# Patient Record
Sex: FEMALE | Race: WHITE | NOT HISPANIC OR LATINO | Employment: FULL TIME | URBAN - METROPOLITAN AREA
[De-identification: names, ages, dates, MRNs, and addresses within clinical notes are randomized per-mention and may not be internally consistent; named-entity substitution may affect disease eponyms.]

---

## 2021-04-27 NOTE — PROGRESS NOTES
"ANA Townsend  [id number]  1975      Reason for visit:  Adnexal mass, elevated CA-125, thickened endometrial stripe on ultrasound thought to be endometrial polyp    Consultation:  Patient is being seen at the request of Dr. Chikis Kuo    History of present illness:  The patient is a 46 y.o. year old female who presents today for treatment and evaluation of the above issues.   She has been followed by Dr. Kuo for a right ovarian cyst found after a TVUS ordered to investigate previous Pap smear 5/2019 that resulted with \"benign endometrial cells.\" Per chart review, this cyst was initially thought to be an endometrioma. Of note, her previous Pap smear in 2018 was also with \"benign endometrial cells.\"  Most recent TVUS demonstrated 5.1 x 3.4 x 4.5 cm right ovarian cyst with septations, which was read as concerning for neoplasm. She was referred for further management. She is otherwise completely asymptomatic. Denies any lower abdominal pain, pressure or bulk symptoms, back pain, early satiety, nausea/vomiting, bladder or bowel complaints, vaginal bleeding or abnormal discharge.  Patient reports that Ca125 level has started to increase as it was being checked along with serial ultrasounds.    For new patients, On license of [facility] intake form from 4/28/2021 was reviewed and confirmed.    OBGYN History:  She is a G0.  She does not use HRT. She does have a history of abnormal pap smears and required LEEP in 2009, though reports normal Pap smears since. Last Pap smear 5/2019 with \"benign endometrial cells.\"      Oncologic History:  Oncology/Hematology History    No history exists.         Past Medical History:   Diagnosis Date   • Abnormal Pap smear of cervix    • Adnexal mass    • Anxiety    • Depression       - Anxiety  - Tracheobronchitis    Past Surgical History:   Procedure Laterality Date   • CERVICAL BIOPSY  W/ LOOP ELECTRODE EXCISION     • CHOLECYSTECTOMY     • LASIK     • TONSILLECTOMY        - Cholecystectomy  - " MACHELLE (2009)  - Lasix (2016)    MEDICATIONS: The current medication list was reviewed with the patient and updated in the EMR this date per the Medical Assistant. Medication dosages and frequencies were confirmed to be accurate.      Allergies:  is allergic to penicillins.   - Penicillin (hives)    Social History:   Social History     Socioeconomic History   • Marital status: Single     Spouse name: Not on file   • Number of children: Not on file   • Years of education: Not on file   • Highest education level: Not on file   Tobacco Use   • Smoking status: Never Smoker   • Smokeless tobacco: Never Used   Substance and Sexual Activity   • Alcohol use: Yes   • Drug use: Never       Family History:    Family History   Problem Relation Age of Onset   • Diabetes Mother    • Heart attack Mother    • Kidney cancer Father    • Diabetes Maternal Grandmother    • Diabetes Maternal Grandfather    • Prostate cancer Paternal Uncle        Health Maintenance:    Health Maintenance   Topic Date Due   • COLONOSCOPY  Never done   • ANNUAL PHYSICAL  Never done   • COVID-19 Vaccine (1) Never done   • TDAP/TD VACCINES (2 - Td) 02/22/2021   • HEPATITIS C SCREENING  Never done   • PAP SMEAR  Never done   • INFLUENZA VACCINE  08/01/2021   • Pneumococcal Vaccine 0-64  Aged Out     - Last mammogram 7/2020    Review of Systems   Constitutional: Negative for appetite change, chills, fatigue, fever and unexpected weight change.   HENT: Negative for congestion, hearing loss, sneezing, sore throat and tinnitus.    Eyes: Negative for visual disturbance.   Respiratory: Negative for cough, shortness of breath and wheezing.    Cardiovascular: Negative for chest pain, palpitations and leg swelling.   Gastrointestinal: Negative for abdominal distention, abdominal pain, constipation, diarrhea, nausea and vomiting.   Genitourinary: Negative for dyspareunia, dysuria, frequency, hematuria, pelvic pain, urgency and vaginal bleeding.   Musculoskeletal:  "Negative for arthralgias, back pain and myalgias.   Skin: Negative for color change, pallor and rash.   Neurological: Negative for dizziness, light-headedness, numbness and headaches.   Hematological: Negative for adenopathy. Does not bruise/bleed easily.   Psychiatric/Behavioral: Negative for dysphoric mood. The patient is not nervous/anxious.        Physical Exam    Vitals:    04/28/21 1322   BP: 137/84   Pulse: 85   Resp: 17   Temp: 98.2 °F (36.8 °C)   TempSrc: Temporal   SpO2: 98%   Weight: 88.8 kg (195 lb 12.8 oz)   Height: 165.1 cm (65\")   PainSc: 0-No pain       Body mass index is 32.58 kg/m².    Wt Readings from Last 3 Encounters:   04/28/21 88.8 kg (195 lb 12.8 oz)     GENERAL: Alert, well-appearing female appearing her stated age who is in no apparent distress.   HEENT: Sclera anicteric. Head normocephalic, atraumatic. Mucus membranes moist.   NECK: Trachea midline, supple, without masses.  No thyromegaly.   BREASTS: Deferred  CARDIOVASCULAR: Normal rate, regular rhythm. No peripheral edema.  RESPIRATORY: Clear to auscultation bilaterally, normal respiratory effort  BACK:  No CVA tenderness, no vertebral tenderness on palpation  GASTROINTESTINAL:  Abdomen is soft, non-tender, non-distended, no rebound or guarding, no masses, or hernias.  SKIN:  Warm, dry, well-perfused.  All visible areas intact.  No rashes, lesions, ulcers.  PSYCHIATRIC: AO x3, with appropriate affect, normal thought processes.  NEUROLOGIC: No focal deficits. Moves extremities well.  MUSCULOSKELETAL: Normal gait and station.   EXTREMITIES:   No cyanosis, clubbing, symmetric.  LYMPHATICS:  No cervical or inguinal adenopathy noted.     PELVIC exam:    External genitalia are free from lesion. On speculum examination, the cervix was free from lesion. On bimanual examination no mass was appreciated.  Uterus was normal in size and shape. There is no cervical motion or uterine tenderness. No cervical mass was palpated. Parametria were smooth. " Rectovaginal exam was deferred.     ECOG PS 0    Diagnostic Data:      TVUS, 4/23/2021 (OS[facility])  Uterus: 7.2 x 4 x 5.8-cm; endometrial thickness 9-mm; retroverted, retroflexed, normal in size. No myometrial abnormality seen. In the region of the fundus, there is a 5-mm diameter hyperechoic endometrial polyp.   Right adnexa: ovary size 4.2 x 6.7 x 4.2cm. Right ovary is enlarged due to presence of a 5.1 x 3.4 x 4.5 cm cyst. It shows incomplete septations. No obvious solid soft tissue is seen in the wall of the cyst. No adnexal mass.   Left adnexa: ovary size 4.1 x 2.3 x 2 cm. Normal in size and appearance. No adnexal mass.   Cul-de-sac: No fluid or mass  Impression:  1. Right ovarian cyst which has a neoplastic appearance. MR pelvis with and without contrast is recommended for further evaluation.   2. Endometrial polyp    No results found for: WBC, HGB, HCT, MCV, PLT, NEUTROABS, GLUCOSE, BUN, CREATININE, EGFRIFNONA, EGFRIFAFRI, NA, K, CL, CO2, MG, PHOS, CALCIUM, ALBUMIN, AST, ALT, BILITOT  No results found for:        CA-125 4/8/2021 (OSH) 67    Assessment/Plan   This is a 46 y.o. woman with a right ovarian cystic mass with newly elevated Ca-125.  Encounter Diagnoses   Name Primary?   • Right ovarian cyst Yes   • Elevated CA-125    • Non-atypical endometrial cells of cervix on Pap smear    • Endometrial polyp    • Endometrial thickening on ultrasound      Right ovarian mass  Elevated CA-125  - Currently asymptomatic  - Adnexal mass has been followed by US since 2018 with previously normal Ca-125. Recommended surgical removal of right ovary due to new ultrasound changes and newly elevated Ca-125.   -Limitations of Ca1 25 testing were discussed.  -Patient understands that subsequent surgery/treatment may be recommended based on findings either at the time of surgery or on final pathology.    Benign endometrial cells on Pap smears 2018 + 2019  Thickened endometrium on ultrasound thought to be endometrial polyp  -  Otherwise asymptomatic  - Recommended hysteroscopy with D&C for endometrial sampling as patient will be asleep and this would give definitive information about the ultrasound findings.    Patient was consented for diagnostic laparoscopy, right salpingo-oophorectomy, left salpingectomy, dilation and curettage, hysteroscopy.      Risks and benefits of surgery were discussed.  This included, but was not limited to, infection and bleeding like when the skin is cut; damage to surrounding structures; and incisional complications.  Risk of DVT was addressed for major surgeries.  Standard of care efforts to minimize these risks were reviewed.  Typical hospital stay and recovery were discussed as well as post-procedure precautions.  Surgical implications of chronic illnesses on recovery and surgical outcome were reviewed.     Pain medication regimen for postoperative care was discussed.  Typical regimen and avoidance of narcotics was discussed.  Patient was educated that other factors, such as existing narcotic use, can impact postoperative pain management.      Patient verbalized understanding of the plan including the risks and benefits.  Appropriate perioperative testing including laboratory evaluation, EKG as clinically indicated, chest x-ray as clinically indicated, and preadmission evaluation were all ordered as a part of this patient's care.      Pain assessment was performed today as a part of patient’s care.  For patients with pain related to surgery, gynecologic malignancy or cancer treatment, the plan is as noted in the assessment/plan.  For patients with pain not related to these issues, they are to seek any further needed care from a more appropriate provider, such as PCP.      Orders Placed This Encounter   Procedures   • SCANNED - IMAGING   • SCANNED - LABS     FOLLOW UP: Surgery    Patient was seen and examined with Dr. Joaquin,  resident, who performed portions of the examination and documentation for  this patient's care under my direct supervision.  I agree with the above documentation and plan.    Yanna Valdez MD  04/29/21  13:24 EDT

## 2021-04-28 ENCOUNTER — OFFICE VISIT (OUTPATIENT)
Dept: GYNECOLOGIC ONCOLOGY | Facility: CLINIC | Age: 46
End: 2021-04-28

## 2021-04-28 ENCOUNTER — PATIENT EDUCATION (SURGERY INSTRUCTIONS) (OUTPATIENT)
Dept: GYNECOLOGIC ONCOLOGY | Facility: CLINIC | Age: 46
End: 2021-04-28

## 2021-04-28 VITALS
SYSTOLIC BLOOD PRESSURE: 137 MMHG | RESPIRATION RATE: 17 BRPM | HEIGHT: 65 IN | DIASTOLIC BLOOD PRESSURE: 84 MMHG | TEMPERATURE: 98.2 F | WEIGHT: 195.8 LBS | HEART RATE: 85 BPM | OXYGEN SATURATION: 98 % | BODY MASS INDEX: 32.62 KG/M2

## 2021-04-28 DIAGNOSIS — R97.1 ELEVATED CA-125: ICD-10-CM

## 2021-04-28 DIAGNOSIS — N84.0 ENDOMETRIAL POLYP: ICD-10-CM

## 2021-04-28 DIAGNOSIS — N83.201 RIGHT OVARIAN CYST: Primary | ICD-10-CM

## 2021-04-28 DIAGNOSIS — R87.618 NON-ATYPICAL ENDOMETRIAL CELLS OF CERVIX ON PAP SMEAR: ICD-10-CM

## 2021-04-28 DIAGNOSIS — R93.89 ENDOMETRIAL THICKENING ON ULTRASOUND: ICD-10-CM

## 2021-04-28 PROCEDURE — 99204 OFFICE O/P NEW MOD 45 MIN: CPT | Performed by: OBSTETRICS & GYNECOLOGY

## 2021-04-28 RX ORDER — BUPROPION HYDROCHLORIDE 300 MG/1
TABLET ORAL
COMMUNITY
Start: 2021-04-23

## 2021-04-28 RX ORDER — FLUTICASONE PROPIONATE 50 MCG
SPRAY, SUSPENSION (ML) NASAL
COMMUNITY
Start: 2021-04-22

## 2021-04-28 RX ORDER — MONTELUKAST SODIUM 10 MG/1
TABLET ORAL
COMMUNITY
Start: 2021-04-23

## 2021-05-04 NOTE — PATIENT INSTRUCTIONS
Outpatient Pre-op Patient Education  [facility]       ANA Townsend  [id number]  1975    SURGEON: Yanna Valdez MD    Surgery Coordinator : Josselyn   If you have any questions           If you have FMLA paperwork or Short Term Disability, please give that to the  either during your visit or after your surgery. A family member may bring the paper work if you can't or your employer may fax it to [phone].               Appointment  1. You have Covid Testing on 05/23/2021  at 8:45 am. This is located at [address] [place] [address] located in the lower level of the building ( basement ). Upon arrival please park in the designated parking spaces located to the left of the building. Once parked, please stay in your car and call [phone]. A member of the clinic will conduct your screening before leaving your vehicle.    1. Your surgery has been scheduled on 05/25/2021 at [facility] located at [address] [facility]. You will need to be there at 9:30 am       The Day(s) Before Surgery  1.  Do not drink alcohol or smoke.     2.  Do not take vitamins or aspirin one week before surgery.       3.  If you are ill on the days leading up to your surgery, please call our office.      4.  If you are using medications for diabetes, call the physician who manages these and get instructions on how they should be taken before and after surgery.    5.  Nothing to eat or drink after midnight on 05/24/2021      6.  Please make prior arrangements for someone to drive you home after your procedure        The Day of Surgery  1.  Do not eat, drink, or chew gum.     2.  On the morning of your surgery, you may take her prescription medications with a sip of water. Bring all medication with you to the surgery center. (Diabetic patients should bring insulin if instructed to do so by their diabetes managing physician).   3. Bathe or shower the morning of your  surgery. Do not use powders, lotions, or creams. Deodorants are okay.     4. Wear loose, comfortable clothing.     5. Bring holders for glasses, contacts, or dentures.      6. Bring any required payment and forms, including insurance cards. Leave all money and valuables at home.        Post-surgery Instructions   1.  For the first 24 hours, rest and take periodic deep breaths to remove anesthetic agents from your body.    2.  Follow any specific instructions relevant to your particular surgery.      3.  Limit activity to avoid stress to the surgical site.       4.  Keep all dressings dry. Shower or bathe as instructed by your doctor.      5.  Drink and eat light foods. Remain on liquids alone only if nausea and vomiting occur. Return to your regular diet gradually, as tolerance allows.     6. Avoid alcohol for at least 24 hours.       7.  Take prescription pain medication as directed and with food.       8.  Call our office after discharge from the surgery center to make your post-op appointment.         In Case of Emergency:       Call our office if you experience any of the following:    Excessive drainage, bleeding, swelling, or redness at the incision site   Severe pain not eased by pain medication  Temperature above 101    Persistent nausea or vomiting    Skin rash or general body itching

## 2021-05-19 ENCOUNTER — TELEPHONE (OUTPATIENT)
Dept: GYNECOLOGIC ONCOLOGY | Facility: CLINIC | Age: 46
End: 2021-05-19

## 2021-05-19 NOTE — TELEPHONE ENCOUNTER
I called patient to confirm her surgery and that she got her instructions. She did not . I have emailed them to her at [email or web address]. she verbalized all her dates and times.

## 2021-05-23 ENCOUNTER — APPOINTMENT (OUTPATIENT)
Dept: PREADMISSION TESTING | Facility: HOSPITAL | Age: 46
End: 2021-05-23

## 2021-05-23 LAB — SARS-COV-2 RNA NOSE QL NAA+PROBE: NOT DETECTED

## 2021-05-23 PROCEDURE — U0004 COV-19 TEST NON-CDC HGH THRU: HCPCS

## 2021-05-23 PROCEDURE — C9803 HOPD COVID-19 SPEC COLLECT: HCPCS

## 2021-05-25 ENCOUNTER — OUTSIDE FACILITY SERVICE (OUTPATIENT)
Dept: GYNECOLOGIC ONCOLOGY | Facility: CLINIC | Age: 46
End: 2021-05-25

## 2021-05-25 ENCOUNTER — LAB REQUISITION (OUTPATIENT)
Dept: LAB | Facility: HOSPITAL | Age: 46
End: 2021-05-25

## 2021-05-25 DIAGNOSIS — N83.201 UNSPECIFIED OVARIAN CYST, RIGHT SIDE: ICD-10-CM

## 2021-05-25 PROCEDURE — 88305 TISSUE EXAM BY PATHOLOGIST: CPT | Performed by: OBSTETRICS & GYNECOLOGY

## 2021-05-25 RX ORDER — IBUPROFEN 600 MG/1
600 TABLET ORAL EVERY 6 HOURS PRN
Qty: 30 TABLET | Refills: 3 | Status: SHIPPED | OUTPATIENT
Start: 2021-05-25

## 2021-05-25 RX ORDER — ONDANSETRON 4 MG/1
4 TABLET, FILM COATED ORAL EVERY 6 HOURS PRN
Qty: 20 TABLET | Refills: 5 | Status: SHIPPED | OUTPATIENT
Start: 2021-05-25

## 2021-05-25 RX ORDER — ACETAMINOPHEN 325 MG/1
650 TABLET ORAL EVERY 6 HOURS PRN
Qty: 60 TABLET | Refills: 1 | Status: SHIPPED | OUTPATIENT
Start: 2021-05-25

## 2021-05-25 RX ORDER — DOCUSATE SODIUM 250 MG
250 CAPSULE ORAL 2 TIMES DAILY
Qty: 60 CAPSULE | Refills: 3 | Status: SHIPPED | OUTPATIENT
Start: 2021-05-25

## 2021-05-25 RX ORDER — OXYCODONE HYDROCHLORIDE 5 MG/1
5 TABLET ORAL EVERY 6 HOURS PRN
Qty: 5 TABLET | Refills: 0 | Status: SHIPPED | OUTPATIENT
Start: 2021-05-25

## 2021-05-26 LAB
CYTO UR: NORMAL
LAB AP CASE REPORT: NORMAL
LAB AP CLINICAL INFORMATION: NORMAL
PATH REPORT.FINAL DX SPEC: NORMAL
PATH REPORT.GROSS SPEC: NORMAL

## 2021-05-27 ENCOUNTER — TELEPHONE (OUTPATIENT)
Dept: GYNECOLOGIC ONCOLOGY | Facility: CLINIC | Age: 46
End: 2021-05-27

## 2021-05-27 NOTE — TELEPHONE ENCOUNTER
----- Message from Yanna Valdez MD sent at 5/26/2021  5:36 PM EDT -----  Please notify patient the final pathology showed endometriotic cyst.  Normal fallopian tubes.  Follow-up as scheduled.  Thank you    ----- Message -----  From: Lab, Background User  Sent: 5/26/2021  10:47 AM EDT  To: Yanna Valdez MD

## 2021-06-16 ENCOUNTER — OFFICE VISIT (OUTPATIENT)
Dept: GYNECOLOGIC ONCOLOGY | Facility: CLINIC | Age: 46
End: 2021-06-16

## 2021-06-16 VITALS
RESPIRATION RATE: 18 BRPM | WEIGHT: 198 LBS | BODY MASS INDEX: 32.99 KG/M2 | DIASTOLIC BLOOD PRESSURE: 84 MMHG | HEART RATE: 84 BPM | TEMPERATURE: 97.8 F | OXYGEN SATURATION: 98 % | SYSTOLIC BLOOD PRESSURE: 129 MMHG | HEIGHT: 65 IN

## 2021-06-16 DIAGNOSIS — Z98.890 POST-OPERATIVE STATE: Primary | ICD-10-CM

## 2021-06-16 PROCEDURE — 99024 POSTOP FOLLOW-UP VISIT: CPT | Performed by: OBSTETRICS & GYNECOLOGY

## 2021-06-16 RX ORDER — LEVONORGESTREL / ETHINYL ESTRADIOL AND ETHINYL ESTRADIOL 150-30(84)
1 KIT ORAL DAILY
Qty: 91 EACH | Refills: 0 | Status: SHIPPED | OUTPATIENT
Start: 2021-06-16

## 2021-06-16 NOTE — PROGRESS NOTES
"ANA Townsend  [id number]  1975      Reason for Visit:   Postoperative evaluation    History of Present Illness:  Patient is a very pleasant 46 y.o. woman who presents for a post operative evaluation status post attempted dilation and curettage with hysteroscopy, diagnostic laparoscopy, bilateral salpingectomy, and right ovarian cystectomy.  At the time of laparoscopy, there is diffuse endometriosis noted in right ureter could not be readily identified due to adhesive disease.  Partial right ovarian cystectomy was performed.  Dilation curettage was not completed due to concern regarding a false tract.  Procedure was performed on 5/25/2021.      Surgery was otherwise uncomplicated.  Today, patient notes normal bowel and bladder function.  Her pain is well controlled. She has questions about resuming normal activities.     Past Medical History, Past Surgical History, Social History, Family History have been reviewed and are without significant changes except as mentioned.    Review of Systems   All other systems were reviewed and are negative except as mentioned above.    Medications:  The current medication list was reviewed in the EMR    ALLERGIES:    Allergies   Allergen Reactions   • Penicillins Hives           /84 Comment: LUE  Pulse 84   Temp 97.8 °F (36.6 °C) (Infrared)   Resp 18   Ht 165.1 cm (65\")   Wt 89.8 kg (198 lb)   SpO2 98% Comment: RA  BMI 32.95 kg/m²   ECOG score: 1              Physical Exam  Constitutional:  Patient is a pleasant woman in no acute distress.  Gastrointestinal: Abdomen is soft and appropriately tender.  There is no mass palpated.  There is no rebound or guarding.  Incision(s) is clean, dry and intact.  Extremities:  Bilateral lower extremities are non-tender.  Gynecologic:External genitalia are free from lesion. On speculum examination, the cervix was free from lesion. On bimanual examination no mass was appreciated.  Uterus was normal in size and shape. There " is no cervical motion or uterine tenderness. No cervical mass was palpated. Parametria were smooth. Rectovaginal exam was deferred.       PATHOLOGY:  Final Diagnosis   RIGHT OVARY CYST WALL AND BILATERAL FALLOPIAN TUBES, PARTIAL OVARIAN CYSTECTOMY AND BILATERAL SALPINGECTOMY:  Endometriotic cyst.  Bilateral fallopian tubes with no significant histopathologic change.     ASSESSMENT/PLAN:  ANA Townsend returns for a post-operative evaluation today.  All pathology reports were discussed with the patient.  We discussed the diagnosis of endometriosis.  Patient was amenable to medical management with oral contraceptives.  She was initiated on continuous dosing oral contraceptives and was encouraged to follow-up with Dr. Kuo for repeat evaluation.  She states that she has an appointment in the near future.      Overall, the patient is very pleased with her care.  I recommended continuation of post operative precautions as discussed.     She is to follow-up here on a as needed basis.    Patient was seen and examined with Dr. Jimenes,  resident, who performed portions of the examination and documentation for this patient's care under my direct supervision.  I agree with the above documentation and plan.    Yanna Valdez MD  06/16/21  20:30 EDT

## 2023-02-16 ENCOUNTER — LAB (OUTPATIENT)
Dept: LAB | Facility: HOSPITAL | Age: 48
End: 2023-02-16
Payer: COMMERCIAL

## 2023-02-16 ENCOUNTER — TRANSCRIBE ORDERS (OUTPATIENT)
Dept: LAB | Facility: HOSPITAL | Age: 48
End: 2023-02-16
Payer: COMMERCIAL

## 2023-02-16 DIAGNOSIS — N83.202 BILATERAL OVARIAN CYSTS: ICD-10-CM

## 2023-02-16 DIAGNOSIS — N83.201 BILATERAL OVARIAN CYSTS: ICD-10-CM

## 2023-02-16 DIAGNOSIS — N83.202 BILATERAL OVARIAN CYSTS: Primary | ICD-10-CM

## 2023-02-16 DIAGNOSIS — N83.201 BILATERAL OVARIAN CYSTS: Primary | ICD-10-CM

## 2023-02-16 PROCEDURE — 86304 IMMUNOASSAY TUMOR CA 125: CPT

## 2023-02-16 PROCEDURE — 36415 COLL VENOUS BLD VENIPUNCTURE: CPT

## 2023-02-17 LAB — CANCER AG125 SERPL QL: 32.4 U/ML (ref 0–38.1)

## 2024-02-05 ENCOUNTER — OFFICE VISIT (OUTPATIENT)
Age: 49
End: 2024-02-05
Payer: COMMERCIAL

## 2024-02-05 VITALS
BODY MASS INDEX: 33.15 KG/M2 | TEMPERATURE: 97.3 F | SYSTOLIC BLOOD PRESSURE: 118 MMHG | HEART RATE: 99 BPM | WEIGHT: 199 LBS | OXYGEN SATURATION: 97 % | DIASTOLIC BLOOD PRESSURE: 76 MMHG | HEIGHT: 65 IN

## 2024-02-05 DIAGNOSIS — G43.009 MIGRAINE WITHOUT AURA AND WITHOUT STATUS MIGRAINOSUS, NOT INTRACTABLE: Primary | ICD-10-CM

## 2024-02-05 PROBLEM — J30.2 SEASONAL ALLERGIES: Status: ACTIVE | Noted: 2021-12-07

## 2024-02-05 PROBLEM — E78.5 HYPERLIPIDEMIA: Status: ACTIVE | Noted: 2021-12-07

## 2024-02-05 PROCEDURE — 99203 OFFICE O/P NEW LOW 30 MIN: CPT | Performed by: NURSE PRACTITIONER

## 2024-02-05 RX ORDER — SUMATRIPTAN 50 MG/1
50 TABLET, FILM COATED ORAL
Qty: 9 TABLET | Refills: 1 | Status: SHIPPED | OUTPATIENT
Start: 2024-02-05

## 2024-02-05 RX ORDER — LEVONORGESTREL 52 MG/1
1 INTRAUTERINE DEVICE INTRAUTERINE
COMMUNITY

## 2024-02-05 NOTE — PROGRESS NOTES
"Chief Complaint  Migraine (5-6 years, worse around menstrual cycle)    Subjective        ANA Townsend presents to [facility]  History of Present Illness  Pt is here today to establish care. She reports headaches about once monthly. She reports sharp pain in the front of the head. She has nausea and mild light sensitivity with the headaches. She has a mirena, but she states the headaches typically occur about the time she would normally have her period. She states headaches started around age 40-45. She as never been treated for headaches. She takes Excedrin Migraine for headaches and states she gets minimal relief with the medication. She has also tried Ibuprofen for headaches and had minimal relief. Headaches are bad enough that she has to miss work with them sometimes. She reports about 6 days out of work a year due to migraines. Headaches typically last 1-2 full days. Last migraine was in early January.     Objective   Vital Signs:  /76   Pulse 99   Temp 97.3 °F (36.3 °C)   Ht 166 cm (65.35\")   Wt 90.3 kg (199 lb)   SpO2 97%   BMI 32.76 kg/m²   Estimated body mass index is 32.76 kg/m² as calculated from the following:    Height as of this encounter: 166 cm (65.35\").    Weight as of this encounter: 90.3 kg (199 lb).           Physical Exam  Vitals reviewed.   Constitutional:       Appearance: Normal appearance.   HENT:      Nose: Nose normal.      Mouth/Throat:      Pharynx: Oropharynx is clear.   Eyes:      Conjunctiva/sclera: Conjunctivae normal.   Cardiovascular:      Rate and Rhythm: Normal rate and regular rhythm.      Heart sounds: Normal heart sounds.   Pulmonary:      Effort: Pulmonary effort is normal.      Breath sounds: Normal breath sounds.   Musculoskeletal:         General: Normal range of motion.      Cervical back: Normal range of motion.   Skin:     General: Skin is warm.   Neurological:      Mental Status: She is alert and oriented to person, " place, and time.   Psychiatric:         Mood and Affect: Mood normal.         Behavior: Behavior normal.         Thought Content: Thought content normal.        Result Review :                   Assessment and Plan     Diagnoses and all orders for this visit:    1. Migraine without aura and without status migrainosus, not intractable (Primary)  -     SUMAtriptan (IMITREX) 50 MG tablet; Take 1 tablet by mouth Every 2 (Two) Hours As Needed for Migraine. Max dose 200 mg per day  Dispense: 9 tablet; Refill: 1           Follow Up     Return in about 2 weeks (around 2/19/2024) for Annual with labs.  Patient was given instructions and counseling regarding her condition or for health maintenance advice. Please see specific information pulled into the AVS if appropriate.

## 2024-02-09 ENCOUNTER — PATIENT ROUNDING (BHMG ONLY) (OUTPATIENT)
Age: 49
End: 2024-02-09
Payer: COMMERCIAL

## 2024-02-09 NOTE — PROGRESS NOTES
My name is Nina and I am the manager of the [facility] [place] office.  I wanted to take a minute and say hello and welcome you to the practice.      I hope you had a good experience with the office.  If you have any questions or concerns please let me know.    Thank you for choosing [facility] and we look forward to taking care of your health needs.     Nina Austin  Practice Manager

## 2024-03-04 ENCOUNTER — OFFICE VISIT (OUTPATIENT)
Age: 49
End: 2024-03-04
Payer: COMMERCIAL

## 2024-03-04 ENCOUNTER — LAB (OUTPATIENT)
Age: 49
End: 2024-03-04
Payer: COMMERCIAL

## 2024-03-04 VITALS
BODY MASS INDEX: 33.42 KG/M2 | DIASTOLIC BLOOD PRESSURE: 80 MMHG | HEART RATE: 95 BPM | TEMPERATURE: 98 F | OXYGEN SATURATION: 97 % | SYSTOLIC BLOOD PRESSURE: 126 MMHG | WEIGHT: 203 LBS

## 2024-03-04 DIAGNOSIS — E66.9 CLASS 1 OBESITY WITH BODY MASS INDEX (BMI) OF 33.0 TO 33.9 IN ADULT, UNSPECIFIED OBESITY TYPE, UNSPECIFIED WHETHER SERIOUS COMORBIDITY PRESENT: ICD-10-CM

## 2024-03-04 DIAGNOSIS — Z00.00 ROUTINE GENERAL MEDICAL EXAMINATION AT A HEALTH CARE FACILITY: ICD-10-CM

## 2024-03-04 DIAGNOSIS — Z00.00 ANNUAL PHYSICAL EXAM: Primary | ICD-10-CM

## 2024-03-04 DIAGNOSIS — B34.9 VIRAL ILLNESS: ICD-10-CM

## 2024-03-04 DIAGNOSIS — E55.9 VITAMIN D DEFICIENCY: ICD-10-CM

## 2024-03-04 DIAGNOSIS — Z00.00 ANNUAL PHYSICAL EXAM: ICD-10-CM

## 2024-03-04 DIAGNOSIS — E55.9 AVITAMINOSIS D: ICD-10-CM

## 2024-03-04 DIAGNOSIS — R05.9 COUGH, UNSPECIFIED TYPE: ICD-10-CM

## 2024-03-04 DIAGNOSIS — E66.9 OBESITY, UNSPECIFIED CLASSIFICATION, UNSPECIFIED OBESITY TYPE, UNSPECIFIED WHETHER SERIOUS COMORBIDITY PRESENT: ICD-10-CM

## 2024-03-04 LAB
EXPIRATION DATE: NORMAL
FLUAV AG UPPER RESP QL IA.RAPID: NOT DETECTED
FLUBV AG UPPER RESP QL IA.RAPID: NOT DETECTED
HBA1C MFR BLD: 5.4 % (ref 4.8–5.6)
INTERNAL CONTROL: NORMAL
Lab: NORMAL
SARS-COV-2 AG UPPER RESP QL IA.RAPID: NOT DETECTED

## 2024-03-04 PROCEDURE — 82306 VITAMIN D 25 HYDROXY: CPT | Performed by: NURSE PRACTITIONER

## 2024-03-04 PROCEDURE — 83036 HEMOGLOBIN GLYCOSYLATED A1C: CPT | Performed by: NURSE PRACTITIONER

## 2024-03-04 PROCEDURE — 80061 LIPID PANEL: CPT | Performed by: NURSE PRACTITIONER

## 2024-03-04 PROCEDURE — 80050 GENERAL HEALTH PANEL: CPT | Performed by: NURSE PRACTITIONER

## 2024-03-04 RX ORDER — DEXTROMETHORPHAN HYDROBROMIDE AND PROMETHAZINE HYDROCHLORIDE 15; 6.25 MG/5ML; MG/5ML
5 SYRUP ORAL 4 TIMES DAILY PRN
Qty: 180 ML | Refills: 0 | Status: SHIPPED | OUTPATIENT
Start: 2024-03-04

## 2024-03-04 NOTE — LETTER
[facility]  Vaccine Consent Form    Patient Name:  ANA Townsend  Patient :  1975     Vaccine(s) Ordered    Tdap Vaccine => 8yo IM (BOOSTRIX)        Screening Checklist  The following questions should be completed prior to vaccination. If you answer “yes” to any question, it does not necessarily mean you should not be vaccinated. It just means we may need to clarify or ask more questions. If a question is unclear, please ask your healthcare provider to explain it.    Yes No   Any fever or moderate to severe illness today (mild illness and/or antibiotic treatment are not contraindications)?     Do you have a history of a serious reaction to any previous vaccinations, such as anaphylaxis, encephalopathy within 7 days, Guillain-Kilmarnock syndrome within 6 weeks, seizure?     Have you received any live vaccine(s) (e.g MMR, MOO) or any other vaccines in the last month (to ensure duplicate doses aren't given)?     Do you have an anaphylactic allergy to latex (DTaP, DTaP-IPV, Hep A, Hep B, MenB, RV, Td, Tdap), baker’s yeast (Hep B, HPV), polysorbates (RSV, nirsevimab, PCV 20, Rotavirrus, Tdap, Shingrix), or gelatin (MOO, MMR)?     Do you have an anaphylactic allergy to neomycin (Rabies, MOO, MMR, IPV, Hep A), polymyxin B (IPV), or streptomycin (IPV)?      Any cancer, leukemia, AIDS, or other immune system disorder? (MOO, MMR, RV)     Do you have a parent, brother, or sister with an immune system problem (if immune competence of vaccine recipient clinically verified, can proceed)? (MMR, MOO)     Any recent steroid treatments for >2 weeks, chemotherapy, or radiation treatment? (MOO, MMR)     Have you received antibody-containing blood transfusions or IVIG in the past 11 months (recommended interval is dependent on product)? (MMR, MOO)     Have you taken antiviral drugs (acyclovir, famciclovir, valacyclovir for MOO) in the last 24 or 48 hours, respectively?      Are you pregnant or planning to become pregnant  "within 1 month? (MOO, MMR, HPV, IPV, MenB, Abrexvy; For Hep B- refer to Engerix-B; For RSV - Abrysvo is indicated for 32-36 weeks of pregnancy from September to January)     For infants, have you ever been told your child has had intussusception or a medical emergency involving obstruction of the intestine (Rotavirus)? If not for an infant, can skip this question.         *Ordering Physicians/APC should be consulted if \"yes\" is checked by the patient or guardian above.  I have received, read, and understand the Vaccine Information Statement (VIS) for each vaccine ordered.  I have considered my or my child's health status as well as the health status of my close contacts.  I have taken the opportunity to discuss my vaccine questions with my or my child's health care provider.   I have requested that the ordered vaccine(s) be given to me or my child.  I understand the benefits and risks of the vaccines.  I understand that I should remain in the clinic for 15 minutes after receiving the vaccine(s).  _________________________________________________________  Signature of Patient or Parent/Legal Guardian ____________________  Date     "

## 2024-03-04 NOTE — PROGRESS NOTES
"Chief Complaint  Cough and Nasal Congestion (Runny nose starting Saturday )    Subjective    {Problem List  Visit Diagnosis   Encounters  Notes  Medications  Labs  Result Review Imaging  Media :23}    ANA Townsend presents to [facility]  History of Present Illness    Objective   Vital Signs:  /80   Pulse 95   Temp 98 °F (36.7 °C)   Wt 92.1 kg (203 lb)   SpO2 97%   BMI 33.42 kg/m²   Estimated body mass index is 33.42 kg/m² as calculated from the following:    Height as of 2/5/24: 166 cm (65.35\").    Weight as of this encounter: 92.1 kg (203 lb).       {BMI is >= 30 and <35. (Class 1 Obesity). The following options were offered after discussion; (Optional):30847}      Physical Exam   Result Review :{Labs  Result Review  Imaging  Med Tab  Media  Procedures :23}    {The following data was reviewed by (Optional):95993}  {Ambulatory Labs (Optional):63093}  {Data reviewed (Optional):75068:::1}             Assessment and Plan {CC Problem List  Visit Diagnosis   ROS  Review (Popup)  Health Maintenance  Quality  BestPractice  Medications  SmartSets  SnapShot Encounters  Media :23}    Diagnoses and all orders for this visit:    1. Cough, unspecified type (Primary)  -     POCT SARS-CoV-2 + Flu Antigen MILENA           {Time Spent (Optional):29396}  Follow Up {Instructions Charge Capture  Follow-up Communications :23}    No follow-ups on file.  Patient was given instructions and counseling regarding her condition or for health maintenance advice. Please see specific information pulled into the AVS if appropriate.         "

## 2024-03-04 NOTE — PROGRESS NOTES
Chief Complaint  Cough, Nasal Congestion (Runny nose starting Saturday ), and Annual Exam    Subjective          ANA Townsend presents to [facility] for   History of Present Illness  Pt is here today for annual physical. She is not feeling well today; reports nasal congestion, cough, runny nose, sore throat. States symptoms started on Saturday. She has been taking Mucinex and Robitussin for symptoms. She states it has been helping some.     She does not have any concerns outside of current illness. She reports a mammogram in the summer of last year at [facility]. She will fill out a TREY today to get those records to us. She is not currently menstruating, but she has a Mirena in place. Unsure is she is post-menopausal. Last PAP was about a year and a half ago. She is unsure when she had last tdap. Will update that today. She is fasting this morning; will do annual labs.     Objective   Vital Signs:   Vitals:    03/04/24 0819   BP: 126/80   Pulse: 95   Temp: 98 °F (36.7 °C)   SpO2: 97%   Weight: 92.1 kg (203 lb)     Body mass index is 33.42 kg/m².        Physical Exam  Vitals reviewed.   Constitutional:       Appearance: Normal appearance. She is obese.   HENT:      Right Ear: Tympanic membrane, ear canal and external ear normal.      Left Ear: Ear canal and external ear normal. A middle ear effusion is present.      Ears:      Comments: Clear effusion     Nose: Congestion and rhinorrhea present.      Mouth/Throat:      Mouth: Mucous membranes are moist.      Pharynx: Oropharynx is clear.   Cardiovascular:      Rate and Rhythm: Normal rate and regular rhythm.      Heart sounds: Normal heart sounds.   Pulmonary:      Effort: Pulmonary effort is normal.      Breath sounds: Normal breath sounds.   Abdominal:      General: Bowel sounds are normal.      Palpations: Abdomen is soft.      Tenderness: There is no abdominal tenderness. There is no guarding or rebound.    Musculoskeletal:         General: Normal range of motion.      Cervical back: Normal range of motion.   Skin:     General: Skin is warm.   Neurological:      Mental Status: She is alert and oriented to person, place, and time.   Psychiatric:         Mood and Affect: Mood normal.         Behavior: Behavior normal.         Thought Content: Thought content normal.        Result Review :                Immunization History   Administered Date(s) Administered    COVID-19 (Vivebio) 06/07/2021, 11/22/2021    Fluzone (or Fluarix & Flulaval for VFC) >6mos 11/19/2014, 01/24/2022, 11/18/2023    Fluzone Quad >6mos (Multi-dose) 12/16/2015, 10/10/2018    Hepatitis A 10/10/2018    Pneumococcal Polysaccharide (PPSV23) 02/22/2011    Shingrix 11/18/2023, 02/04/2024    Tdap 02/22/2011, 03/04/2024     Health Maintenance   Topic Date Due    MAMMOGRAM  Never done    HEPATITIS C SCREENING  Never done    ANNUAL PHYSICAL  Never done    PAP SMEAR  Never done    COVID-19 Vaccine (3 - 2023-24 season) 09/01/2023    LIPID PANEL  Never done    BMI FOLLOWUP  03/04/2025    COLORECTAL CANCER SCREENING  08/07/2025    TDAP/TD VACCINES (3 - Td or Tdap) 03/04/2034    INFLUENZA VACCINE  Completed    ZOSTER VACCINE  Completed    Pneumococcal Vaccine 0-64  Aged Out     Results for orders placed or performed in visit on 03/04/24   POCT SARS-CoV-2 + Flu Antigen MILENA    Specimen: Swab   Result Value Ref Range    SARS Antigen Not Detected Not Detected, Presumptive Negative    Influenza A Antigen MILENA Not Detected Not Detected    Influenza B Antigen MILENA Not Detected Not Detected    Internal Control Passed Passed    Lot Number [id number]     Expiration Date 01/17/25           Assessment and Plan    Diagnoses and all orders for this visit:    1. Annual physical exam (Primary)  -     Tdap Vaccine => 6yo IM (BOOSTRIX)  -     CBC (No Diff); Future  -     Comprehensive Metabolic Panel; Future  -     Lipid Panel; Future    2. Viral illness    3. Cough, unspecified  type  -     POCT SARS-CoV-2 + Flu Antigen MILENA  -     promethazine-dextromethorphan (PROMETHAZINE-DM) 6.25-15 MG/5ML syrup; Take 5 mL by mouth 4 (Four) Times a Day As Needed for Cough.  Dispense: 180 mL; Refill: 0    4. Vitamin D deficiency  -     Vitamin D,25-Hydroxy; Future    5. Class 1 obesity with body mass index (BMI) of 33.0 to 33.9 in adult, unspecified obesity type, unspecified whether serious comorbidity present  -     Hemoglobin A1c; Future  -     TSH Rfx On Abnormal To Free T4; Future      Counseling/anticipatory guidance: Nutrition, physical activity, healthy weight, dental health, mental health, eye exam, immunizations, screenings    Discussed routine vaccines with pt; updating tdap today. Discussed routine labs; pt is fasting for visit. Advised pt of PAP recommendations; established with GYN. Mammogram up to date.      Follow Up   Return in about 1 year (around 3/4/2025) for Annual.  Patient was given instructions and counseling regarding her condition or for health maintenance advice. Please see specific information pulled into the AVS if appropriate.

## 2024-03-05 DIAGNOSIS — E55.9 VITAMIN D DEFICIENCY: Primary | ICD-10-CM

## 2024-03-05 LAB
25(OH)D3 SERPL-MCNC: 18.5 NG/ML (ref 30–100)
ALBUMIN SERPL-MCNC: 4 G/DL (ref 3.5–5.2)
ALBUMIN/GLOB SERPL: 1.1 G/DL
ALP SERPL-CCNC: 99 U/L (ref 39–117)
ALT SERPL W P-5'-P-CCNC: 17 U/L (ref 1–33)
ANION GAP SERPL CALCULATED.3IONS-SCNC: 13.2 MMOL/L (ref 5–15)
AST SERPL-CCNC: 25 U/L (ref 1–32)
BILIRUB SERPL-MCNC: 0.8 MG/DL (ref 0–1.2)
BUN SERPL-MCNC: 9 MG/DL (ref 6–20)
BUN/CREAT SERPL: 9.8 (ref 7–25)
CALCIUM SPEC-SCNC: 8.8 MG/DL (ref 8.6–10.5)
CHLORIDE SERPL-SCNC: 103 MMOL/L (ref 98–107)
CHOLEST SERPL-MCNC: 209 MG/DL (ref 0–200)
CO2 SERPL-SCNC: 22.8 MMOL/L (ref 22–29)
CREAT SERPL-MCNC: 0.92 MG/DL (ref 0.57–1)
DEPRECATED RDW RBC AUTO: 42.1 FL (ref 37–54)
EGFRCR SERPLBLD CKD-EPI 2021: 76 ML/MIN/1.73
ERYTHROCYTE [DISTWIDTH] IN BLOOD BY AUTOMATED COUNT: 12.8 % (ref 12.3–15.4)
GLOBULIN UR ELPH-MCNC: 3.5 GM/DL
GLUCOSE SERPL-MCNC: 87 MG/DL (ref 65–99)
HCT VFR BLD AUTO: 40 % (ref 34–46.6)
HDLC SERPL-MCNC: 34 MG/DL (ref 40–60)
HGB BLD-MCNC: 13.4 G/DL (ref 12–15.9)
LDLC SERPL CALC-MCNC: 142 MG/DL (ref 0–100)
LDLC/HDLC SERPL: 4.09 {RATIO}
MCH RBC QN AUTO: 30.7 PG (ref 26.6–33)
MCHC RBC AUTO-ENTMCNC: 33.5 G/DL (ref 31.5–35.7)
MCV RBC AUTO: 91.7 FL (ref 79–97)
PLATELET # BLD AUTO: 390 10*3/MM3 (ref 140–450)
PMV BLD AUTO: 9.6 FL (ref 6–12)
POTASSIUM SERPL-SCNC: 4.2 MMOL/L (ref 3.5–5.2)
PROT SERPL-MCNC: 7.5 G/DL (ref 6–8.5)
RBC # BLD AUTO: 4.36 10*6/MM3 (ref 3.77–5.28)
SODIUM SERPL-SCNC: 139 MMOL/L (ref 136–145)
TRIGL SERPL-MCNC: 179 MG/DL (ref 0–150)
TSH SERPL DL<=0.05 MIU/L-ACNC: 1.51 UIU/ML (ref 0.27–4.2)
VLDLC SERPL-MCNC: 33 MG/DL (ref 5–40)
WBC NRBC COR # BLD AUTO: 10.35 10*3/MM3 (ref 3.4–10.8)

## 2024-03-05 RX ORDER — ERGOCALCIFEROL 1.25 MG/1
50000 CAPSULE ORAL WEEKLY
Qty: 10 CAPSULE | Refills: 0 | Status: SHIPPED | OUTPATIENT
Start: 2024-03-05

## 2024-10-08 DIAGNOSIS — G43.009 MIGRAINE WITHOUT AURA AND WITHOUT STATUS MIGRAINOSUS, NOT INTRACTABLE: ICD-10-CM

## 2024-10-08 RX ORDER — SUMATRIPTAN 50 MG/1
50 TABLET, FILM COATED ORAL
Qty: 9 TABLET | Refills: 1 | Status: SHIPPED | OUTPATIENT
Start: 2024-10-08

## 2024-10-08 NOTE — TELEPHONE ENCOUNTER
Rx Refill Note  Requested Prescriptions     Pending Prescriptions Disp Refills    SUMAtriptan (IMITREX) 50 MG tablet 9 tablet 1     Sig: Take 1 tablet by mouth Every 2 (Two) Hours As Needed for Migraine. Max dose 200 mg per day      Last office visit with prescribing clinician: 3/4/2024   Last telemedicine visit with prescribing clinician: Visit date not found   Next office visit with prescribing clinician: 3/5/2025                         Would you like a call back once the refill request has been completed: [] Yes [] No    If the office needs to give you a call back, can they leave a voicemail: [] Yes [] No    Girma Lomeli MA  10/08/24, 10:39 EDT

## 2025-03-05 ENCOUNTER — OFFICE VISIT (OUTPATIENT)
Age: 50
End: 2025-03-05
Payer: COMMERCIAL

## 2025-03-05 ENCOUNTER — LAB (OUTPATIENT)
Age: 50
End: 2025-03-05
Payer: COMMERCIAL

## 2025-03-05 VITALS
HEART RATE: 81 BPM | WEIGHT: 212.9 LBS | BODY MASS INDEX: 35.47 KG/M2 | HEIGHT: 65 IN | OXYGEN SATURATION: 99 % | DIASTOLIC BLOOD PRESSURE: 86 MMHG | SYSTOLIC BLOOD PRESSURE: 132 MMHG

## 2025-03-05 DIAGNOSIS — E55.9 VITAMIN D DEFICIENCY: ICD-10-CM

## 2025-03-05 DIAGNOSIS — Z00.00 ANNUAL PHYSICAL EXAM: Primary | ICD-10-CM

## 2025-03-05 DIAGNOSIS — E66.812 CLASS 2 OBESITY WITHOUT SERIOUS COMORBIDITY WITH BODY MASS INDEX (BMI) OF 35.0 TO 35.9 IN ADULT, UNSPECIFIED OBESITY TYPE: ICD-10-CM

## 2025-03-05 DIAGNOSIS — Z00.00 ANNUAL PHYSICAL EXAM: ICD-10-CM

## 2025-03-05 DIAGNOSIS — Z12.31 BREAST CANCER SCREENING BY MAMMOGRAM: ICD-10-CM

## 2025-03-05 DIAGNOSIS — Z11.59 ENCOUNTER FOR HEPATITIS C SCREENING TEST FOR LOW RISK PATIENT: ICD-10-CM

## 2025-03-05 PROCEDURE — 80050 GENERAL HEALTH PANEL: CPT | Performed by: NURSE PRACTITIONER

## 2025-03-05 PROCEDURE — 83036 HEMOGLOBIN GLYCOSYLATED A1C: CPT | Performed by: NURSE PRACTITIONER

## 2025-03-05 PROCEDURE — 86803 HEPATITIS C AB TEST: CPT | Performed by: NURSE PRACTITIONER

## 2025-03-05 PROCEDURE — 82306 VITAMIN D 25 HYDROXY: CPT | Performed by: NURSE PRACTITIONER

## 2025-03-05 PROCEDURE — 36415 COLL VENOUS BLD VENIPUNCTURE: CPT | Performed by: NURSE PRACTITIONER

## 2025-03-05 PROCEDURE — 80061 LIPID PANEL: CPT | Performed by: NURSE PRACTITIONER

## 2025-03-05 RX ORDER — BUSPIRONE HYDROCHLORIDE 5 MG/1
5 TABLET ORAL 3 TIMES DAILY
Qty: 90 TABLET | Refills: 0 | Status: SHIPPED | OUTPATIENT
Start: 2025-03-05

## 2025-03-05 NOTE — PROGRESS NOTES
"Chief Complaint  Annual Exam    Subjective          Jaja Townsend presents to [facility] for   History of Present Illness  History of Present Illness  The patient presents for a physical exam.    She is experiencing significant work-related anxiety and is considering resuming Wellbutrin or a similar medication. She has previously been on Wellbutrin, which was initially effective, but Zoloft was later added to her regimen. However, she did not perceive any additional benefits from the Zoloft. She has also tried Lexapro for a duration of 6 weeks, but it induced severe nausea. Her depression screening score today was elevated.    She has expressed interest in weight loss medications but is not inclined towards injectable treatments.    She has undergone LASIK surgery several years ago and acknowledges the need for an ophthalmological screening, which she has not yet scheduled. She is also considering the use of reading glasses.    She typically undergoes annual mammograms and has a history of abnormal mammograms, necessitating ultrasound examinations. She has never undergone a colonoscopy. She is due for her Prevnar vaccine. She has been attempting to incorporate probiotics into her diet, which she finds beneficial. She has had her fallopian tubes removed. She is not currently menstruating, which she attributes to either her IUD or her age.    MEDICATIONS  Past: Wellbutrin, Zoloft, Lexapro    IMMUNIZATIONS  She is due for her Prevnar vaccine.    Objective   Vital Signs:   Vitals:    03/05/25 0814   BP: 132/86   BP Location: Right arm   Patient Position: Sitting   Cuff Size: Adult   Pulse: 81   SpO2: 99%   Weight: 96.6 kg (212 lb 14.4 oz)   Height: 166 cm (65.35\")   PainSc: 0-No pain     Body mass index is 35.05 kg/m².    Class 2 Severe Obesity (BMI >=35 and <=39.9). Obesity-related health conditions include the following: dyslipidemias. Obesity is worsening. BMI is is above average; " BMI management plan is completed. We discussed portion control and increasing exercise.    The following portions of the patient's history were reviewed and updated as appropriate: allergies, current medications, past family history, past medical history, past social history, past surgical history, and problem list.      Physical Exam  Vitals and nursing note reviewed.   Constitutional:       Appearance: Normal appearance. She is obese.   HENT:      Right Ear: Tympanic membrane, ear canal and external ear normal.      Left Ear: Tympanic membrane, ear canal and external ear normal.      Nose: Nose normal.      Mouth/Throat:      Mouth: Mucous membranes are moist.      Pharynx: Oropharynx is clear.   Eyes:      Conjunctiva/sclera: Conjunctivae normal.      Pupils: Pupils are equal, round, and reactive to light.   Cardiovascular:      Rate and Rhythm: Normal rate and regular rhythm.      Heart sounds: Normal heart sounds.   Pulmonary:      Effort: Pulmonary effort is normal.      Breath sounds: Normal breath sounds.   Abdominal:      General: Bowel sounds are normal.      Palpations: Abdomen is soft.   Musculoskeletal:         General: Normal range of motion.      Cervical back: Normal range of motion and neck supple.   Skin:     General: Skin is warm.   Neurological:      Mental Status: She is alert and oriented to person, place, and time.   Psychiatric:         Mood and Affect: Mood normal.         Behavior: Behavior normal.         Thought Content: Thought content normal.        Result Review :                Immunization History   Administered Date(s) Administered    COVID-19 (Cybera) 06/07/2021, 06/16/2021, 11/22/2021    Fluzone (or Fluarix & Flulaval for VFC) >6mos 11/19/2014, 01/24/2022, 11/18/2023    Fluzone Quad >6mos (Multi-dose) 12/16/2015, 10/10/2018    Hepatitis A 10/10/2018    Influenza Inj MDCK Preserative Free 01/18/2025    Pneumococcal Conjugate 20-Valent (PCV20) 03/05/2025    Pneumococcal  Polysaccharide (PPSV23) 02/22/2011    Shingrix 11/18/2023, 02/04/2024    Tdap 02/22/2011, 03/04/2024     Health Maintenance   Topic Date Due    HEPATITIS C SCREENING  Never done    PAP SMEAR  Never done    LIPID PANEL  03/04/2025    MAMMOGRAM  06/04/2025    COLORECTAL CANCER SCREENING  08/07/2025    COVID-19 Vaccine (4 - 2024-25 season) 03/07/2025 (Originally 9/1/2024)    ANNUAL PHYSICAL  03/05/2026    BMI FOLLOWUP  03/05/2026    TDAP/TD VACCINES (3 - Td or Tdap) 03/04/2034    INFLUENZA VACCINE  Completed    Pneumococcal Vaccine 50+  Completed    ZOSTER VACCINE  Completed        Assessment and Plan    Diagnoses and all orders for this visit:    1. Annual physical exam (Primary)  -     CBC (No Diff); Future  -     Comprehensive Metabolic Panel; Future  -     Lipid Panel; Future  -     Pneumococcal Conjugate Vaccine 20-Valent (PCV20)    2. Class 2 obesity without serious comorbidity with body mass index (BMI) of 35.0 to 35.9 in adult, unspecified obesity type  -     Hemoglobin A1c; Future  -     TSH Rfx On Abnormal To Free T4; Future    3. Vitamin D deficiency  -     Vitamin D,25-Hydroxy; Future    4. Encounter for hepatitis C screening test for low risk patient  -     Hepatitis C Antibody; Future    5. Breast cancer screening by mammogram  -     Mammo Screening Digital Tomosynthesis Bilateral With CAD; Future    Other orders  -     busPIRone (BUSPAR) 5 MG tablet; Take 1 tablet by mouth 3 (Three) Times a Day.  Dispense: 90 tablet; Refill: 0      Assessment & Plan  1. Anxiety.  She reports significant anxiety at work and has previously taken Wellbutrin and Zoloft without substantial benefit. She experienced nausea with Lexapro and has not tried citalopram. Buspirone 5 mg three times a day will be initiated, with a follow-up in two weeks to assess effectiveness and tolerability.    2. Elevated BMI.  Her BMI is slightly elevated. She is advised to engage in regular physical activity and maintain a balanced diet. She is  informed about the availability of a weight management clinic at [facility], which offers various treatment options including oral medications. She will consider this and inform us of her decision.    3. Health Maintenance.  A comprehensive set of labs will be ordered today, including CBC, CMP, cholesterol panel, vitamin D, A1c, thyroid screening, and hep C antibody. She is due for a mammogram in June 2025, which will be scheduled. She is also due for a Cologuard test in August 2025. The Prevnar vaccine will be administered today. She is advised to schedule an annual eye examination.    Follow-up  The patient will follow up in 1 month.    PROCEDURE  The patient underwent LASIK surgery several years ago. She has had her fallopian tubes removed and most of her ovaries, but retains some ovarian tissue.    Counseling/anticipatory guidance: Nutrition, physical activity, healthy weight, dental health, mental health, eye exam, immunizations, screenings   - Reviewed immunization records. Discussed routine labs. Mammogram ordered today. Cologuard due later this year.       Follow Up   Return in about 1 month (around 4/5/2025) for Recheck; medication follow-up in office or virtually. Annual physical in 1 year. .  Patient was given instructions and counseling regarding her condition or for health maintenance advice. Please see specific information pulled into the AVS if appropriate.     Patient or patient representative verbalized consent for the use of Ambient Listening during the visit with  SEA Jacome for chart documentation. 3/5/2025  08:33 EST

## 2025-03-05 NOTE — LETTER
[facility]  Vaccine Consent Form    Patient Name:  ANA Townsend  Patient :  1975     Vaccine(s) Ordered    Pneumococcal Conjugate Vaccine 20-Valent (PCV20)        Screening Checklist  The following questions should be completed prior to vaccination. If you answer “yes” to any question, it does not necessarily mean you should not be vaccinated. It just means we may need to clarify or ask more questions. If a question is unclear, please ask your healthcare provider to explain it.    Yes No   Any fever or moderate to severe illness today (mild illness and/or antibiotic treatment are not contraindications)?     Do you have a history of a serious reaction to any previous vaccinations, such as anaphylaxis, encephalopathy within 7 days, Guillain-[place] syndrome within 6 weeks, seizure?     Have you received any live vaccine(s) (e.g MMR, MOO) or any other vaccines in the last month (to ensure duplicate doses aren't given)?     Do you have an anaphylactic allergy to latex (DTaP, DTaP-IPV, Hep A, Hep B, MenB, RV, Td, Tdap), baker’s yeast (Hep B, HPV), polysorbates (RSV, nirsevimab, PCV 20, Rotavirrus, Tdap, Shingrix), or gelatin (MOO, MMR)?     Do you have an anaphylactic allergy to neomycin (Rabies, MOO, MMR, IPV, Hep A), polymyxin B (IPV), or streptomycin (IPV)?      Any cancer, leukemia, AIDS, or other immune system disorder? (MOO, MMR, RV)     Do you have a parent, brother, or sister with an immune system problem (if immune competence of vaccine recipient clinically verified, can proceed)? (MMR, MOO)     Any recent steroid treatments for >2 weeks, chemotherapy, or radiation treatment? (MOO, MMR)     Have you received antibody-containing blood transfusions or IVIG in the past 11 months (recommended interval is dependent on product)? (MMR, MOO)     Have you taken antiviral drugs (acyclovir, famciclovir, valacyclovir for MOO) in the last 24 or 48 hours, respectively?      Are you pregnant or planning to  "become pregnant within 1 month? (MOO, MMR, HPV, IPV, MenB, Abrexvy; For Hep B- refer to Engerix-B; For RSV - Abrysvo is indicated for 32-36 weeks of pregnancy from September to January)     For infants, have you ever been told your child has had intussusception or a medical emergency involving obstruction of the intestine (Rotavirus)? If not for an infant, can skip this question.         *Ordering Physicians/APC should be consulted if \"yes\" is checked by the patient or guardian above.  I have received, read, and understand the Vaccine Information Statement (VIS) for each vaccine ordered.  I have considered my or my child's health status as well as the health status of my close contacts.  I have taken the opportunity to discuss my vaccine questions with my or my child's health care provider.   I have requested that the ordered vaccine(s) be given to me or my child.  I understand the benefits and risks of the vaccines.  I understand that I should remain in the clinic for 15 minutes after receiving the vaccine(s).  _________________________________________________________  Signature of Patient or Parent/Legal Guardian ____________________  Date     "

## 2025-03-06 LAB
25(OH)D3 SERPL-MCNC: 24.5 NG/ML (ref 30–100)
ALBUMIN SERPL-MCNC: 4 G/DL (ref 3.5–5.2)
ALBUMIN/GLOB SERPL: 1.1 G/DL
ALP SERPL-CCNC: 116 U/L (ref 39–117)
ALT SERPL W P-5'-P-CCNC: 16 U/L (ref 1–33)
ANION GAP SERPL CALCULATED.3IONS-SCNC: 9.1 MMOL/L (ref 5–15)
AST SERPL-CCNC: 23 U/L (ref 1–32)
BILIRUB SERPL-MCNC: 0.6 MG/DL (ref 0–1.2)
BUN SERPL-MCNC: 11 MG/DL (ref 6–20)
BUN/CREAT SERPL: 13.1 (ref 7–25)
CALCIUM SPEC-SCNC: 9.1 MG/DL (ref 8.6–10.5)
CHLORIDE SERPL-SCNC: 106 MMOL/L (ref 98–107)
CHOLEST SERPL-MCNC: 199 MG/DL (ref 0–200)
CO2 SERPL-SCNC: 24.9 MMOL/L (ref 22–29)
CREAT SERPL-MCNC: 0.84 MG/DL (ref 0.57–1)
DEPRECATED RDW RBC AUTO: 43.7 FL (ref 37–54)
EGFRCR SERPLBLD CKD-EPI 2021: 84.3 ML/MIN/1.73
ERYTHROCYTE [DISTWIDTH] IN BLOOD BY AUTOMATED COUNT: 12.4 % (ref 12.3–15.4)
GLOBULIN UR ELPH-MCNC: 3.5 GM/DL
GLUCOSE SERPL-MCNC: 92 MG/DL (ref 65–99)
HBA1C MFR BLD: 5.1 % (ref 4.8–5.6)
HCT VFR BLD AUTO: 39.2 % (ref 34–46.6)
HCV AB SER QL: NORMAL
HDLC SERPL-MCNC: 26 MG/DL (ref 40–60)
HGB BLD-MCNC: 13.3 G/DL (ref 12–15.9)
LDLC SERPL CALC-MCNC: 137 MG/DL (ref 0–100)
LDLC/HDLC SERPL: 5.13 {RATIO}
MCH RBC QN AUTO: 32.8 PG (ref 26.6–33)
MCHC RBC AUTO-ENTMCNC: 33.9 G/DL (ref 31.5–35.7)
MCV RBC AUTO: 96.6 FL (ref 79–97)
PLATELET # BLD AUTO: 386 10*3/MM3 (ref 140–450)
PMV BLD AUTO: 10.2 FL (ref 6–12)
POTASSIUM SERPL-SCNC: 4.3 MMOL/L (ref 3.5–5.2)
PROT SERPL-MCNC: 7.5 G/DL (ref 6–8.5)
RBC # BLD AUTO: 4.06 10*6/MM3 (ref 3.77–5.28)
SODIUM SERPL-SCNC: 140 MMOL/L (ref 136–145)
TRIGL SERPL-MCNC: 198 MG/DL (ref 0–150)
TSH SERPL DL<=0.05 MIU/L-ACNC: 1.66 UIU/ML (ref 0.27–4.2)
VLDLC SERPL-MCNC: 36 MG/DL (ref 5–40)
WBC NRBC COR # BLD AUTO: 8.52 10*3/MM3 (ref 3.4–10.8)

## 2025-03-14 LAB
NCCN CRITERIA FLAG: NORMAL
TYRER CUZICK SCORE: 12

## 2025-03-19 ENCOUNTER — HOSPITAL ENCOUNTER (OUTPATIENT)
Dept: MAMMOGRAPHY | Facility: HOSPITAL | Age: 50
Discharge: HOME | End: 2025-03-19
Admitting: NURSE PRACTITIONER
Payer: COMMERCIAL

## 2025-03-19 DIAGNOSIS — Z12.31 BREAST CANCER SCREENING BY MAMMOGRAM: ICD-10-CM

## 2025-03-19 PROCEDURE — 77067 SCR MAMMO BI INCL CAD: CPT

## 2025-03-19 PROCEDURE — 77063 BREAST TOMOSYNTHESIS BI: CPT

## 2025-04-07 ENCOUNTER — OFFICE VISIT (OUTPATIENT)
Age: 50
End: 2025-04-07
Payer: COMMERCIAL

## 2025-04-07 VITALS
DIASTOLIC BLOOD PRESSURE: 78 MMHG | HEART RATE: 87 BPM | BODY MASS INDEX: 35.32 KG/M2 | WEIGHT: 212 LBS | SYSTOLIC BLOOD PRESSURE: 120 MMHG | OXYGEN SATURATION: 99 % | HEIGHT: 65 IN

## 2025-04-07 DIAGNOSIS — F41.1 GAD (GENERALIZED ANXIETY DISORDER): ICD-10-CM

## 2025-04-07 DIAGNOSIS — D22.9 ATYPICAL MOLE: Primary | ICD-10-CM

## 2025-04-07 PROCEDURE — 99214 OFFICE O/P EST MOD 30 MIN: CPT | Performed by: NURSE PRACTITIONER

## 2025-04-07 RX ORDER — BUSPIRONE HYDROCHLORIDE 5 MG/1
5 TABLET ORAL 3 TIMES DAILY
Qty: 180 TABLET | Refills: 3 | Status: SHIPPED | OUTPATIENT
Start: 2025-04-07

## 2025-04-07 NOTE — PROGRESS NOTES
"Answers submitted by the patient for this visit:  Anxiety (Submitted on 4/5/2025)  Chief Complaint: Anxiety  Visit: follow-up  Frequency: most days  Severity: moderate  chest pain: No  confusion: No  depressed mood: Yes  dizziness: No  dry mouth: No  excessive worry: No  insomnia: Yes  irritability: Yes  malaise/fatigue: Yes  nausea: No  obsessions: No  palpitations: No  shortness of breath: No  Sleep quality: poor  Hours of sleep per night: 6 Hours  Aggravated by: social activities, work stress  Medication compliance: %  Chief Complaint  Anxiety (Feels that the buspar is working well) and Suspicious Skin Lesion (Middle of lower back)    Subjective        Jaja Townsend presents to [facility]  History of Present Illness    History of Present Illness  The patient presents for evaluation of anxiety and a mole.    She reports a positive response to BuSpar, which she has been taking at a dosage of 5 mg three times daily for the past month. She is not experiencing any adverse effects from this medication. She is currently on BuSpar 5 mg 3 times a day.    Approximately a month ago, she observed a lesion on her skin that exhibits flakiness. Initially, she perceived it as a bump and proceeded to scratch it, leading her to believe it might be a mole. However, due to its location, she is unable to visually confirm this. She does not have an established relationship with a dermatologist.    MEDICATIONS  BuSpar    Results       Objective   Vital Signs:  /78 (BP Location: Right arm, Patient Position: Sitting, Cuff Size: Large Adult)   Pulse 87   Ht 166 cm (65.35\")   Wt 96.2 kg (212 lb)   SpO2 99%   BMI 34.90 kg/m²   Estimated body mass index is 34.9 kg/m² as calculated from the following:    Height as of this encounter: 166 cm (65.35\").    Weight as of this encounter: 96.2 kg (212 lb).        Physical Exam  Vitals reviewed.   Constitutional:       Appearance: Normal " appearance.   HENT:      Nose: Nose normal.      Mouth/Throat:      Mouth: Mucous membranes are moist.      Pharynx: Oropharynx is clear.   Eyes:      Conjunctiva/sclera: Conjunctivae normal.   Cardiovascular:      Rate and Rhythm: Normal rate and regular rhythm.      Heart sounds: Normal heart sounds.   Pulmonary:      Effort: Pulmonary effort is normal.      Breath sounds: Normal breath sounds.   Musculoskeletal:         General: Normal range of motion.      Cervical back: Normal range of motion.   Skin:     General: Skin is warm.      Findings: Lesion present.   Neurological:      Mental Status: She is alert and oriented to person, place, and time.   Psychiatric:         Mood and Affect: Mood normal.         Behavior: Behavior normal.         Thought Content: Thought content normal.        Result Review :                  Assessment and Plan   Diagnoses and all orders for this visit:    1. Atypical mole (Primary)  -     Ambulatory Referral to Dermatology    2. STARR (generalized anxiety disorder)  -     busPIRone (BUSPAR) 5 MG tablet; Take 1 tablet by mouth 3 (Three) Times a Day.  Dispense: 180 tablet; Refill: 3        Assessment & Plan  1. Anxiety.  She reports that BuSpar 5 mg three times a day is working well without any issues. A prescription refill for BuSpar 5 mg, sufficient for a 90-day period, will be provided and sent to [facility].    2. Skin lesion.  She has a flaky lesion that appeared about a month ago, which she has been scratching. It looks like a mole that has been irritated. A referral to dermatology will be initiated for further evaluation. She will be contacted within 7 to 10 days to set up the appointment.    Follow-up  The patient will follow up in March 2026 or sooner if needed.       The following portions of the patient's history were reviewed and updated as appropriate: allergies, current medications, past family history, past medical history, past social history, past  surgical history, and problem list.       Follow Up   Return for Next scheduled follow up.  Patient was given instructions and counseling regarding her condition or for health maintenance advice. Please see specific information pulled into the AVS if appropriate.         Patient or patient representative verbalized consent for the use of Ambient Listening during the visit with  SEA Jacome for chart documentation. 4/7/2025  09:50 EDT

## 2025-04-08 ENCOUNTER — HOSPITAL ENCOUNTER (OUTPATIENT)
Facility: HOSPITAL | Age: 50
Discharge: HOME | End: 2025-04-08
Payer: COMMERCIAL

## 2025-04-08 ENCOUNTER — TRANSCRIBE ORDERS (OUTPATIENT)
Dept: ADMINISTRATIVE | Facility: HOSPITAL | Age: 50
End: 2025-04-08
Payer: COMMERCIAL

## 2025-04-08 DIAGNOSIS — R92.8 ABNORMAL MAMMOGRAM: ICD-10-CM

## 2025-04-08 DIAGNOSIS — R92.8 ABNORMAL MAMMOGRAM: Primary | ICD-10-CM

## 2025-04-08 PROCEDURE — 77065 DX MAMMO INCL CAD UNI: CPT

## 2025-04-08 PROCEDURE — 76642 ULTRASOUND BREAST LIMITED: CPT | Performed by: RADIOLOGY

## 2025-04-08 PROCEDURE — 76642 ULTRASOUND BREAST LIMITED: CPT

## 2025-04-08 PROCEDURE — 77061 BREAST TOMOSYNTHESIS UNI: CPT | Performed by: RADIOLOGY

## 2025-04-08 PROCEDURE — G0279 TOMOSYNTHESIS, MAMMO: HCPCS

## 2025-04-08 PROCEDURE — 77065 DX MAMMO INCL CAD UNI: CPT | Performed by: RADIOLOGY

## 2025-04-18 ENCOUNTER — HOSPITAL ENCOUNTER (OUTPATIENT)
Facility: HOSPITAL | Age: 50
Discharge: HOME | End: 2025-04-18
Payer: COMMERCIAL

## 2025-04-18 DIAGNOSIS — R92.8 ABNORMAL MAMMOGRAM: ICD-10-CM

## 2025-04-18 PROCEDURE — C1819 TISSUE LOCALIZATION-EXCISION: HCPCS

## 2025-04-18 PROCEDURE — 25010000002 LIDOCAINE 1 % SOLUTION: Performed by: RADIOLOGY

## 2025-04-18 PROCEDURE — 25010000002 LIDOCAINE 1% - EPINEPHRINE 1:100000 1 %-1:100000 SOLUTION: Performed by: RADIOLOGY

## 2025-04-18 RX ORDER — LIDOCAINE HYDROCHLORIDE 10 MG/ML
10 INJECTION, SOLUTION INFILTRATION; PERINEURAL ONCE
Status: COMPLETED | OUTPATIENT
Start: 2025-04-18 | End: 2025-04-18

## 2025-04-18 RX ORDER — LIDOCAINE HYDROCHLORIDE AND EPINEPHRINE 10; 10 MG/ML; UG/ML
10 INJECTION, SOLUTION INFILTRATION; PERINEURAL ONCE
Status: COMPLETED | OUTPATIENT
Start: 2025-04-18 | End: 2025-04-18

## 2025-04-18 RX ADMIN — LIDOCAINE HYDROCHLORIDE 5 ML: 10 INJECTION, SOLUTION INFILTRATION; PERINEURAL at 10:49

## 2025-04-18 RX ADMIN — LIDOCAINE HYDROCHLORIDE,EPINEPHRINE BITARTRATE 9 ML: 10; .01 INJECTION, SOLUTION INFILTRATION; PERINEURAL at 10:50

## 2025-04-18 NOTE — PROGRESS NOTES
Alert and oriented. Denies discomfort, no active bleeding, steri-strips not visualized, gauze dressing intact. Cold pack applied to breast over bandage. Questions answered, support given. Verbalizes and demonstrates understanding of post-care instructions, written copy given.

## 2025-04-22 ENCOUNTER — TELEPHONE (OUTPATIENT)
Facility: HOSPITAL | Age: 50
End: 2025-04-22
Payer: COMMERCIAL

## 2025-06-25 ENCOUNTER — OFFICE VISIT (OUTPATIENT)
Age: 50
End: 2025-06-25
Payer: COMMERCIAL

## 2025-06-25 VITALS
OXYGEN SATURATION: 99 % | SYSTOLIC BLOOD PRESSURE: 132 MMHG | DIASTOLIC BLOOD PRESSURE: 88 MMHG | BODY MASS INDEX: 35.39 KG/M2 | HEART RATE: 70 BPM | WEIGHT: 215 LBS

## 2025-06-25 DIAGNOSIS — T63.301A SPIDER BITE ALLERGY, CURRENT REACTION, ACCIDENTAL OR UNINTENTIONAL, INITIAL ENCOUNTER: Primary | ICD-10-CM

## 2025-06-25 DIAGNOSIS — Z12.11 ENCOUNTER FOR SCREENING FOR MALIGNANT NEOPLASM OF COLON: ICD-10-CM

## 2025-06-25 PROCEDURE — 99213 OFFICE O/P EST LOW 20 MIN: CPT

## 2025-06-25 RX ORDER — TRIAMCINOLONE ACETONIDE 1 MG/G
1 OINTMENT TOPICAL 2 TIMES DAILY
Qty: 28 G | Refills: 0 | Status: SHIPPED | OUTPATIENT
Start: 2025-06-25 | End: 2025-07-09

## 2025-06-25 NOTE — PROGRESS NOTES
Acute Office Visit      Date: 2025   Patient Name: Jaja Townsend  : 1975   MRN: [id number]     Chief Complaint:    Chief Complaint   Patient presents with    Rash     Left inner thigh       History of Present Illness: Jaja Townsend is a 50 y.o. female.      History of Present Illness  The patient presents for evaluation of a rash on his leg.    He reports the onset of the rash approximately 2 weeks ago, suspecting it to be a reaction to a spider bite. The rash is located on his inner thigh, and he believes the incident occurred indoors. Despite the application of Benadryl gel, calamine lotion, and cortisone cream, the rash has not shown signs of improvement. It remains swollen and intensely itchy. He does not observe any dark spots within the rash.    He is due for colon cancer screening.    Subjective      Review of Systems:   Review of Systems    I have reviewed the patients family history, social history, past medical history, past surgical history and have updated it as appropriate.     Medications:     Current Outpatient Medications:     busPIRone (BUSPAR) 5 MG tablet, Take 1 tablet by mouth 3 (Three) Times a Day., Disp: 180 tablet, Rfl: 3    fluticasone (FLONASE) 50 MCG/ACT nasal spray, , Disp: , Rfl:     Levonorgestrel (Mirena, 52 MG,) 20 MCG/DAY intrauterine device IUD, To be inserted one time by prescriber. Route intrauterine., Disp: , Rfl:     montelukast (SINGULAIR) 10 MG tablet, , Disp: , Rfl:     multivitamin with minerals (MULTIVITAMIN ADULT PO), Take 1 tablet by mouth Daily., Disp: , Rfl:     SUMAtriptan (IMITREX) 50 MG tablet, Take 1 tablet by mouth Every 2 (Two) Hours As Needed for Migraine. Max dose 200 mg per day, Disp: 9 tablet, Rfl: 1    triamcinolone (KENALOG) 0.1 % ointment, Apply 1 Application topically to the appropriate area as directed 2 (Two) Times a Day for 14 days., Disp: 28 g, Rfl: 0    Allergies:   Allergies   Allergen Reactions    Penicillins Hives        Objective     Physical Exam: Please see above  Vital Signs:   Vitals:    06/25/25 0832   BP: 132/88   Pulse: 70   SpO2: 99%   Weight: 97.5 kg (215 lb)     Body mass index is 35.39 kg/m².    Physical Exam  Constitutional:       Appearance: Normal appearance.   HENT:      Head: Normocephalic and atraumatic.      Nose: No congestion or rhinorrhea.      Mouth/Throat:      Pharynx: No oropharyngeal exudate or posterior oropharyngeal erythema.   Eyes:      General:         Right eye: No discharge.         Left eye: No discharge.      Extraocular Movements: Extraocular movements intact.      Pupils: Pupils are equal, round, and reactive to light.   Cardiovascular:      Rate and Rhythm: Normal rate and regular rhythm.      Heart sounds: No murmur heard.     No friction rub. No gallop.   Pulmonary:      Effort: Pulmonary effort is normal.      Breath sounds: Normal breath sounds.   Abdominal:      General: Abdomen is flat. Bowel sounds are normal. There is no distension.      Palpations: Abdomen is soft.      Tenderness: There is no abdominal tenderness. There is no guarding or rebound.   Musculoskeletal:         General: Normal range of motion.      Cervical back: Normal range of motion.      Right lower leg: No edema.      Left lower leg: No edema.   Skin:     General: Skin is warm.      Capillary Refill: Capillary refill takes less than 2 seconds.      Findings: Rash present.      Comments: Linchefication of left thigh rash   Visible two punctate areas of bite    Neurological:      General: No focal deficit present.      Mental Status: She is alert.   Psychiatric:         Mood and Affect: Mood normal.         Procedures    Results:   Labs:   Hemoglobin A1C   Date Value Ref Range Status   03/05/2025 5.10 4.80 - 5.60 % Final     TSH   Date Value Ref Range Status   03/05/2025 1.660 0.270 - 4.200 uIU/mL Final        Imaging:   No valid procedures specified.     Assessment / Plan      Assessment/Plan:     Diagnoses and  all orders for this visit:    1. Spider bite allergy, current reaction, accidental or unintentional, initial encounter (Primary)  -     triamcinolone (KENALOG) 0.1 % ointment; Apply 1 Application topically to the appropriate area as directed 2 (Two) Times a Day for 14 days.  Dispense: 28 g; Refill: 0    2. Encounter for screening for malignant neoplasm of colon  -     Cologuard - Stool, Per Rectum; Future         Assessment & Plan  1. Spider bite reaction   - Rash appeared a couple of weeks ago, suspected to be from a spider bite.  - Patient has been using Benadryl gel, calamine lotion, and cortisone cream without significant improvement.  - Rash remains swollen and very itchy.  - Will treat with  triamcinolone, continue antihistamine  - If it does not improve can send steroid treatment     2. Health maintenance.  - Patient is due for colon cancer screening.  - Cologuard test will be ordered for colon cancer screening.  - Note will be sent to Rosy regarding this.       Follow Up:   No follow-ups on file.    Patient or patient representative verbalized consent for the use of Ambient Listening during the visit with  Kurtis Nava MD for chart documentation. 6/25/2025  08:57 EDT      Kurtis Nava MD   [facility] – [place]